# Patient Record
Sex: FEMALE | URBAN - METROPOLITAN AREA
[De-identification: names, ages, dates, MRNs, and addresses within clinical notes are randomized per-mention and may not be internally consistent; named-entity substitution may affect disease eponyms.]

---

## 2017-09-18 ENCOUNTER — THERAPY VISIT (OUTPATIENT)
Dept: PHYSICAL THERAPY | Facility: CLINIC | Age: 57
End: 2017-09-18
Payer: OTHER MISCELLANEOUS

## 2017-09-18 DIAGNOSIS — M25.512 ACUTE PAIN OF LEFT SHOULDER: Primary | ICD-10-CM

## 2017-09-18 PROCEDURE — 97010 HOT OR COLD PACKS THERAPY: CPT | Mod: GP | Performed by: PHYSICAL THERAPIST

## 2017-09-18 PROCEDURE — 97110 THERAPEUTIC EXERCISES: CPT | Mod: GP | Performed by: PHYSICAL THERAPIST

## 2017-09-18 PROCEDURE — 97161 PT EVAL LOW COMPLEX 20 MIN: CPT | Mod: GP | Performed by: PHYSICAL THERAPIST

## 2017-09-18 NOTE — PROGRESS NOTES
Subjective:    Patient is a 57 year old female presenting with rehab left ankle/foot hpi.                                      Pertinent medical history includes:  Sleep disorder/apnea and overweight.    Other surgeries include:  Orthopedic surgery.  Current medications:  Anti-inflammatory and other.  Current occupation is .    Primary job tasks include:  Lifting, driving and repetitive tasks.                                Objective:    System    Physical Exam    General     ROS    Assessment/Plan:

## 2017-09-18 NOTE — LETTER
[facility]  [address] [address] [place] [id number]  [phone]    2017    Re: Irqa Huynh   :   1960  MRN:  [id number]   REFERRING PHYSICIAN:   Favian Mast    [facility]  Date of Initial Evaluation:  17  Visits:  Rxs Used: 1  Reason for Referral:  Acute pain of left shoulder    EVALUATION SUMMARY  [facility] Initial Evaluation  Subjective:  Patient is a 57 year old female presenting with rehab left shoulder hpi. The history is provided by the patient. No  was used.   Iqra Huynh is a 57 year old female with a left shoulder condition.  Condition occurred with:  Repetition/overuse.  Condition occurred: at work.  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 Iqra is right arm dominant.  Currently not driving since last Friday..    Patient reports pain:  Lateral and in the joint.    Pain is described as aching and is intermittent Pain Scale: 1-3/10 when painful.  Associated symptoms:  Loss of motion/stiffness. 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  Symptoms are exacerbated by using arm overhead, using arm behind back and lying on extremity and relieved by rest, ice and NSAID's.  Since onset symptoms are gradually improving.  Special testing: no imaging.  Previous treatment includes physical therapy and surgery (Right ACL and TKA (7 surgeries)).  There was moderate improvement following previous treatment.  General health as reported by patient is good.  Patient is currently not working due to present treatment problem.   Pertinent medical history includes:  Sleep disorder/apnea and overweight.    Other surgeries include:  Orthopedic surgery.  Current medications:  Anti-inflammatory and other.  Current occupation  is .    Primary job tasks include:  Lifting, driving and repetitive tasks.    Barriers include:  None as reported by the patient.    Red flags:  None as reported by the patient.        Objective:  Standing Alignment:    Cervical/Thoracic:  Forward head  Shoulder/UE:  Rounded shoulders, protracted scapula R and protracted scapula L  Gait:    Gait Type:  Normal   Assistive Devices:  None  Non-Weight Bearing:  normal     Flexibility/Screens:   Positive screens:  Shoulder  Upper Extremity:    Decreased left upper extremity flexibility at:  Pectoralis Major and Pectoralis Minor  Decreased right upper extremity flexibility present at:  Pectoralis Major and Pectoralis Minor  Spine:  Decreased left spine flexibility:  Upper Trap  Decreased right spine flexibility:  Upper Trap       Shoulder Evaluation:  ROM:  AROM:    Flexion:  Left:  160+    Right:  170  Abduction:  Left: 155++   Right:  180  Flexion/External Rotation:  Left:  T2    Right:  T2  Extension/Internal Rotation:  Left:  T7    Right:  T7      Re: Iqra Huynh   :   1960  PROM:    Flexion:  Left:  160+ end range    Right: WNL    Abduction:  Left:  160+ end range pain    Right:  WNL  Internal Rotation:  Left:  35+    Right:  40  Strength:  normal (Left shoulder strong with mild pain on resisted ABD at 90 degrees)  Stability Testing:  normal  Special Tests:    Left shoulder positive for the following special tests:  Impingement  Left shoulder negative for the following special tests:  Labral; Bursal; Rotator cuff tear and Acromioclavicular  Palpation:    Left shoulder tenderness present at:  Infraspinatus and Teres Minor  Left shoulder tenderness not present at: Supraspinatus; Subscapularis; Levator; Rhomboids; Upper Trap or Bicipital Groove  Mobility Tests:    Glenohumeral posterior left:  Hypomobile      Assessment/Plan:    Patient is a 57 year old female with left side shoulder complaints.    Patient has the following significant findings  with corresponding treatment plan.        Diagnosis 1:  Shoulder Impingement      Pain -  hot/cold therapy, US, manual therapy, self management, education and home program  Decreased ROM/flexibility - manual therapy, therapeutic exercise and home program  Decreased strength - therapeutic exercise, therapeutic activities and home program  Inflammation - cold therapy, US and self management/home program  Impaired muscle performance - neuro re-education and home program  Decreased function - therapeutic activities and home program  Impaired posture - neuro re-education and home program    Therapy Evaluation Codes:   1) History comprised of:   Personal factors that impact the plan of care:      None.    Comorbidity factors that impact the plan of care are:      Numbness/tingling, Overweight, Pain at night/rest and Sleep disorder/apnea.     Medications impacting care: Anti-inflammatory.  2) Examination of Body Systems comprised of:   Body structures and functions that impact the plan of care:      Shoulder.   Activity limitations that impact the plan of care are:      Lifting, Laying down and Reaching.  3) Clinical presentation characteristics are:   Stable/Uncomplicated.  4) Decision-Making    Low complexity using standardized patient assessment instrument and/or measureable assessment of functional outcome.  Cumulative Therapy Evaluation is: Low complexity.    Previous and current functional limitations:  (See Goal Flow Sheet for this information)    Short term and Long term goals: (See Goal Flow Sheet for this information)     Communication ability:  Patient appears to be able to clearly communicate and understand verbal and written communication and follow directions correctly.  Treatment Explanation - The following has been discussed with the patient:   RX ordered/plan of care  Anticipated outcomes  Possible risks and side effects  This patient would benefit from PT intervention to resume normal activities.   Rehab  potential is good.            Re: Iqra Huynh   :   1960    Frequency:  2 X week, once daily  Duration:  for 3 weeks  Discharge Plan:  Achieve all LTG.  Independent in home treatment program.  Return to work with or without restrictions.  Return to previous functional level by discharge.  Reach maximal therapeutic benefit.          Thank you for your referral.    INQUIRIES  Therapist: DEBORAH Ibarra [address] [address]  Phone: [phone]  Fax: [phone]

## 2017-09-18 NOTE — PROGRESS NOTES
[facility] Initial Evaluation    Subjective:    Patient is a 57 year old female presenting with rehab left shoulder hpi. The history is provided by the patient. No  was used.   Iqra Huynh is a 57 year old female with a left shoulder condition.  Condition occurred with:  Repetition/overuse.  Condition occurred: at work.  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 Iqra is right arm dominant.  Currently not driving since last Friday..    Patient reports pain:  Lateral and in the joint.    Pain is described as aching and is intermittent Pain Scale: 1-3/10 when painful.  Associated symptoms:  Loss of motion/stiffness. 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  Symptoms are exacerbated by using arm overhead, using arm behind back and lying on extremity and relieved by rest, ice and NSAID's.  Since onset symptoms are gradually improving.  Special testing: no imaging.  Previous treatment includes physical therapy and surgery (Right ACL and TKA (7 surgeries)).  There was moderate improvement following previous treatment.  General health as reported by patient is good.            Patient is currently not working due to present treatment problem.      Barriers include:  None as reported by the patient.    Red flags:  None as reported by the patient.                        Objective:    Standing Alignment:    Cervical/Thoracic:  Forward head  Shoulder/UE:  Rounded shoulders, protracted scapula R and protracted scapula L              Gait:    Gait Type:  Normal   Assistive Devices:  None    Non-Weight Bearing:  normal             Flexibility/Screens:   Positive screens:  Shoulder  Upper Extremity:    Decreased left upper extremity flexibility at:  Pectoralis Major and Pectoralis Minor    Decreased  right upper extremity flexibility present at:  Pectoralis Major and Pectoralis Minor    Spine:  Decreased left spine flexibility:  Upper Trap    Decreased right spine flexibility:  Upper Trap                       Shoulder Evaluation:  ROM:  AROM:    Flexion:  Left:  160+    Right:  170    Abduction:  Left: 155++   Right:  180                Flexion/External Rotation:  Left:  T2    Right:  T2  Extension/Internal Rotation:  Left:  T7    Right:  T7    PROM:    Flexion:  Left:  160+ end range    Right: WNL      Abduction:  Left:  160+ end range pain    Right:  WNL    Internal Rotation:  Left:  35+    Right:  40                      Strength:  normal (Left shoulder strong with mild pain on resisted ABD at 90 degrees)                      Stability Testing:  normal      Special Tests:    Left shoulder positive for the following special tests:  Impingement  Left shoulder negative for the following special tests:  Labral; Bursal; Rotator cuff tear and Acromioclavicular    Palpation:    Left shoulder tenderness present at:  Infraspinatus and Teres Minor  Left shoulder tenderness not present at: Supraspinatus; Subscapularis; Levator; Rhomboids; Upper Trap or Bicipital Groove    Mobility Tests:      Glenohumeral posterior left:  Hypomobile                                               General     ROS    Assessment/Plan:      Patient is a 57 year old female with left side shoulder complaints.    Patient has the following significant findings with corresponding treatment plan.                  Diagnosis 1:  Shoulder Impingement      Pain -  hot/cold therapy, US, manual therapy, self management, education and home program  Decreased ROM/flexibility - manual therapy, therapeutic exercise and home program  Decreased strength - therapeutic exercise, therapeutic activities and home program  Inflammation - cold therapy, US and self management/home program  Impaired muscle performance - neuro re-education and home program  Decreased  function - therapeutic activities and home program  Impaired posture - neuro re-education and home program    Therapy Evaluation Codes:   1) History comprised of:   Personal factors that impact the plan of care:      None.    Comorbidity factors that impact the plan of care are:      Numbness/tingling, Overweight, Pain at night/rest and Sleep disorder/apnea.     Medications impacting care: Anti-inflammatory.  2) Examination of Body Systems comprised of:   Body structures and functions that impact the plan of care:      Shoulder.   Activity limitations that impact the plan of care are:      Lifting, Laying down and Reaching.  3) Clinical presentation characteristics are:   Stable/Uncomplicated.  4) Decision-Making    Low complexity using standardized patient assessment instrument and/or measureable assessment of functional outcome.  Cumulative Therapy Evaluation is: Low complexity.    Previous and current functional limitations:  (See Goal Flow Sheet for this information)    Short term and Long term goals: (See Goal Flow Sheet for this information)     Communication ability:  Patient appears to be able to clearly communicate and understand verbal and written communication and follow directions correctly.  Treatment Explanation - The following has been discussed with the patient:   RX ordered/plan of care  Anticipated outcomes  Possible risks and side effects  This patient would benefit from PT intervention to resume normal activities.   Rehab potential is good.    Frequency:  2 X week, once daily  Duration:  for 3 weeks  Discharge Plan:  Achieve all LTG.  Independent in home treatment program.  Return to work with or without restrictions.  Return to previous functional level by discharge.  Reach maximal therapeutic benefit.    Please refer to the daily flowsheet for treatment today, total treatment time and time spent performing 1:1 timed codes.

## 2017-09-21 ENCOUNTER — THERAPY VISIT (OUTPATIENT)
Dept: PHYSICAL THERAPY | Facility: CLINIC | Age: 57
End: 2017-09-21
Payer: OTHER MISCELLANEOUS

## 2017-09-21 DIAGNOSIS — M25.512 ACUTE PAIN OF LEFT SHOULDER: ICD-10-CM

## 2017-09-21 PROCEDURE — 97110 THERAPEUTIC EXERCISES: CPT | Mod: GP | Performed by: PHYSICAL THERAPIST

## 2017-09-21 PROCEDURE — 97112 NEUROMUSCULAR REEDUCATION: CPT | Mod: GP | Performed by: PHYSICAL THERAPIST

## 2017-09-21 PROCEDURE — 97010 HOT OR COLD PACKS THERAPY: CPT | Mod: GP | Performed by: PHYSICAL THERAPIST

## 2017-09-25 ENCOUNTER — THERAPY VISIT (OUTPATIENT)
Dept: PHYSICAL THERAPY | Facility: CLINIC | Age: 57
End: 2017-09-25
Payer: OTHER MISCELLANEOUS

## 2017-09-25 DIAGNOSIS — M25.512 ACUTE PAIN OF LEFT SHOULDER: ICD-10-CM

## 2017-09-25 PROCEDURE — 97035 APP MDLTY 1+ULTRASOUND EA 15: CPT | Mod: GP | Performed by: PHYSICAL THERAPIST

## 2017-09-25 PROCEDURE — 97010 HOT OR COLD PACKS THERAPY: CPT | Mod: GP | Performed by: PHYSICAL THERAPIST

## 2017-09-25 PROCEDURE — 97140 MANUAL THERAPY 1/> REGIONS: CPT | Mod: GP | Performed by: PHYSICAL THERAPIST

## 2017-09-25 PROCEDURE — 97110 THERAPEUTIC EXERCISES: CPT | Mod: GP | Performed by: PHYSICAL THERAPIST

## 2017-09-27 ENCOUNTER — THERAPY VISIT (OUTPATIENT)
Dept: PHYSICAL THERAPY | Facility: CLINIC | Age: 57
End: 2017-09-27
Payer: OTHER MISCELLANEOUS

## 2017-09-27 DIAGNOSIS — M25.512 ACUTE PAIN OF LEFT SHOULDER: ICD-10-CM

## 2017-09-27 PROCEDURE — 97035 APP MDLTY 1+ULTRASOUND EA 15: CPT | Mod: GP | Performed by: PHYSICAL THERAPIST

## 2017-09-27 PROCEDURE — 97110 THERAPEUTIC EXERCISES: CPT | Mod: GP | Performed by: PHYSICAL THERAPIST

## 2017-09-27 PROCEDURE — 97140 MANUAL THERAPY 1/> REGIONS: CPT | Mod: GP | Performed by: PHYSICAL THERAPIST

## 2017-09-27 NOTE — PROGRESS NOTES
Subjective:    HPI                    Objective:    System    Physical Exam    General     ROS    Assessment/Plan:      PROGRESS  REPORT    Progress reporting period is from 9-18-17 to 9-27-17.       SUBJECTIVE  Subjective changes noted by patient:  Iqra reports interm vs constant pain anterior left shoulder. Aggravated if sleeps in wrong position. Mild pain with reaching overhead =2-3/10.     Current pain level is: 1/10 (2-3/10 overhead).     Previous pain level was: 6/10.   Changes in function:  Yes (See Goal flowsheet attached for changes in current functional level)  Adverse reaction to treatment or activity: None    OBJECTIVE  Changes noted in objective findings: Active Left shoulder FLexion = 170 (+10 degrees) with end range pain = 1/10. Strong but pain reported with resisted left shoulder ER, Flex and Abd @ 90 degrees. Suspect anterior shoulder impingement with biceps tendonitis (subscap?). Iqra making progress in ROM, strength and decreased pain.      ASSESSMENT/PLAN  Updated problem list and treatment plan: Diagnosis 1:  Left Ant. Shoulder Impingement  Pain -  hot/cold therapy, US, manual therapy, self management, education and home program  Decreased ROM/flexibility - manual therapy, therapeutic exercise and home program  Decreased strength - therapeutic exercise, therapeutic activities and home program  Inflammation - cold therapy, US and self management/home program  Impaired muscle performance - neuro re-education and home program  Decreased function - therapeutic activities and home program  Impaired posture - neuro re-education and home program  STG/LTGs have been met or progress has been made towards goals:  Yes (See Goal flow sheet completed today.)  Assessment of Progress: The patient's condition is improving.  Patient is meeting short term goals and is progressing towards long term goals.  Self Management Plans:  Patient has been instructed in a home treatment program.  Patient  has been  instructed in self management of symptoms.  I have re-evaluated this patient and find that the nature, scope, duration and intensity of the therapy is appropriate for the medical condition of the patient.  Iqra continues to require the following intervention to meet STG and LTG's:  PT    Recommendations:  This patient would benefit from continued therapy.     Frequency:  2 X week, once daily  Duration:  for 2 weeks          Please refer to the daily flowsheet for treatment today, total treatment time and time spent performing 1:1 timed codes.

## 2017-10-02 ENCOUNTER — THERAPY VISIT (OUTPATIENT)
Dept: PHYSICAL THERAPY | Facility: CLINIC | Age: 57
End: 2017-10-02
Payer: OTHER MISCELLANEOUS

## 2017-10-02 DIAGNOSIS — M25.512 ACUTE PAIN OF LEFT SHOULDER: ICD-10-CM

## 2017-10-02 PROCEDURE — 97140 MANUAL THERAPY 1/> REGIONS: CPT | Mod: GP | Performed by: PHYSICAL THERAPIST

## 2017-10-02 PROCEDURE — 97035 APP MDLTY 1+ULTRASOUND EA 15: CPT | Mod: GP | Performed by: PHYSICAL THERAPIST

## 2017-10-02 PROCEDURE — 97110 THERAPEUTIC EXERCISES: CPT | Mod: GP | Performed by: PHYSICAL THERAPIST

## 2017-10-05 ENCOUNTER — THERAPY VISIT (OUTPATIENT)
Dept: PHYSICAL THERAPY | Facility: CLINIC | Age: 57
End: 2017-10-05
Payer: OTHER MISCELLANEOUS

## 2017-10-05 DIAGNOSIS — M25.512 ACUTE PAIN OF LEFT SHOULDER: ICD-10-CM

## 2017-10-05 PROCEDURE — 97110 THERAPEUTIC EXERCISES: CPT | Mod: GP | Performed by: PHYSICAL THERAPIST

## 2017-10-05 PROCEDURE — 97035 APP MDLTY 1+ULTRASOUND EA 15: CPT | Mod: GP | Performed by: PHYSICAL THERAPIST

## 2017-10-05 PROCEDURE — 97014 ELECTRIC STIMULATION THERAPY: CPT | Mod: GP | Performed by: PHYSICAL THERAPIST

## 2017-10-05 PROCEDURE — 97140 MANUAL THERAPY 1/> REGIONS: CPT | Mod: GP | Performed by: PHYSICAL THERAPIST

## 2017-10-11 ENCOUNTER — THERAPY VISIT (OUTPATIENT)
Dept: PHYSICAL THERAPY | Facility: CLINIC | Age: 57
End: 2017-10-11
Payer: OTHER MISCELLANEOUS

## 2017-10-11 DIAGNOSIS — M25.512 ACUTE PAIN OF LEFT SHOULDER: ICD-10-CM

## 2017-10-11 PROCEDURE — 97035 APP MDLTY 1+ULTRASOUND EA 15: CPT | Mod: GP | Performed by: PHYSICAL THERAPIST

## 2017-10-11 PROCEDURE — 97110 THERAPEUTIC EXERCISES: CPT | Mod: GP | Performed by: PHYSICAL THERAPIST

## 2017-10-11 PROCEDURE — 97140 MANUAL THERAPY 1/> REGIONS: CPT | Mod: GP | Performed by: PHYSICAL THERAPIST

## 2017-10-11 NOTE — PROGRESS NOTES
Subjective:    HPI                    Objective:    System    Physical Exam    General     ROS    Assessment/Plan:      PROGRESS  REPORT    Progress reporting period is from 9-18-17 to 10-11-17.       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 She reports intermittent left shoulder pain. Can become irritated reaching overhead, out to side and reaching back. Unable to sleep on left shoulder yet. HEP going well with experience of fatigue or stretch. Feels the exercises are beneficial.     Current pain level is: 0-2/10.     Previous pain level was: 6/10.   Changes in function:  Yes (See Goal flowsheet attached for changes in current functional level)  Adverse reaction to treatment or activity: None    OBJECTIVE  Changes noted in objective findings:   Strong left shoulder with MMT with mild pain left UT muscle (substitution pattern). AROM left shoulder Flex = 170 with pain = 2/10 end range. Denies TTP left biceps tendon but today UT and Levator muscle TTP. 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      ASSESSMENT/PLAN  Updated problem list and treatment plan: Diagnosis 1:  Left Ant Shoulder Impinement  Pain -  hot/cold therapy, US, manual therapy, self management, education and home program  Decreased ROM/flexibility - manual therapy, therapeutic exercise, therapeutic activity and home program  Decreased strength - therapeutic exercise, therapeutic activities and home program  Inflammation - cold therapy, US and self management/home program  Impaired muscle performance - neuro re-education and home program  Decreased function - therapeutic activities and home program  STG/LTGs have been met or progress has been made towards goals:  Yes (See Goal flow sheet completed today.)  Assessment of Progress: The patient's condition is  improving.  Patient is meeting short term goals and is progressing towards long term goals.  Self Management Plans:  Patient is independent in a home treatment program.  Patient is independent in self management of symptoms.  I have re-evaluated this patient and find that the nature, scope, duration and intensity of the therapy is appropriate for the medical condition of the patient.  Iqra continues to require the following intervention to meet STG and LTG's:  PT    Recommendations:  This patient would benefit from continued therapy.     Frequency:  2 X week, once daily  Duration:  for 1-2 weeks          Please refer to the daily flowsheet for treatment today, total treatment time and time spent performing 1:1 timed codes.

## 2017-10-11 NOTE — LETTER
[facility]  [address] [address] [place] [id number]  [phone]    2017    Re: Iqra Huynh   :   1960  MRN:  [id number]   REFERRING PHYSICIAN:   Favian Mast    [facility]    Date of Initial Evaluation:  17  Visits:  Rxs Used: 7  Reason for Referral:  Acute pain of left shoulder    PROGRESS  REPORT    Progress reporting period is from 17 to 10-11-17.       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 She reports intermittent left shoulder pain. Can become irritated reaching overhead, out to side and reaching back. Unable to sleep on left shoulder yet. HEP going well with experience of fatigue or stretch. Feels the exercises are beneficial.     Current pain level is: 0-2/10.     Previous pain level was: 6/10.   Changes in function:  Yes (See Goal flowsheet attached for changes in current functional level)  Adverse reaction to treatment or activity: None    OBJECTIVE  Changes noted in objective findings:   Strong left shoulder with MMT with mild pain left UT muscle (substitution pattern). AROM left shoulder Flex = 170 with pain = 2/10 end range. Denies TTP left biceps tendon but today UT and Levator muscle TTP. 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      ASSESSMENT/PLAN  Updated problem list and treatment plan: Diagnosis 1:  Left Ant Shoulder Impinement  Pain -  hot/cold therapy, US, manual therapy, self management, education and home program  Decreased ROM/flexibility - manual therapy, therapeutic exercise, therapeutic activity and home program  Decreased strength - therapeutic exercise, therapeutic activities and home program  Inflammation - cold therapy, US and self management/home program  Impaired muscle performance - neuro re-education and home  program  Decreased function - therapeutic activities and home program  STG/LTGs have been met or progress has been made towards goals:  Yes (See Goal flow sheet completed today.)  Assessment of Progress: The patient's condition is improving.  Patient is meeting short term goals and is progressing towards long term goals.  Self Management Plans:  Patient is independent in a home treatment program.  Patient is independent in self management of symptoms.  I have re-evaluated this patient and find that the nature, scope, duration and intensity of the therapy is appropriate for the medical condition of the patient.  Iqra continues to require the following intervention to meet STG and LTG's:  PT    Recommendations:  This patient would benefit from continued therapy.     Frequency:  2 X week, once daily  Duration:  for 1-2 weeks    Thank you for your referral.    INQUIRIES  Therapist: Adriana Hartman, PT   [address] [address]  Phone: [phone]  Fax: [phone]

## 2017-10-13 ENCOUNTER — THERAPY VISIT (OUTPATIENT)
Dept: PHYSICAL THERAPY | Facility: CLINIC | Age: 57
End: 2017-10-13
Payer: OTHER MISCELLANEOUS

## 2017-10-13 ENCOUNTER — TELEPHONE (OUTPATIENT)
Dept: PHYSICAL THERAPY | Facility: CLINIC | Age: 57
End: 2017-10-13

## 2017-10-13 DIAGNOSIS — M25.512 ACUTE PAIN OF LEFT SHOULDER: ICD-10-CM

## 2017-10-13 PROCEDURE — 97035 APP MDLTY 1+ULTRASOUND EA 15: CPT | Mod: GP | Performed by: PHYSICAL THERAPIST

## 2017-10-13 PROCEDURE — 97110 THERAPEUTIC EXERCISES: CPT | Mod: GP | Performed by: PHYSICAL THERAPIST

## 2017-10-13 PROCEDURE — 97140 MANUAL THERAPY 1/> REGIONS: CPT | Mod: GP | Performed by: PHYSICAL THERAPIST

## 2017-10-16 ENCOUNTER — THERAPY VISIT (OUTPATIENT)
Dept: PHYSICAL THERAPY | Facility: CLINIC | Age: 57
End: 2017-10-16
Payer: OTHER MISCELLANEOUS

## 2017-10-16 DIAGNOSIS — M25.512 ACUTE PAIN OF LEFT SHOULDER: ICD-10-CM

## 2017-10-16 PROCEDURE — 97035 APP MDLTY 1+ULTRASOUND EA 15: CPT | Mod: GP | Performed by: PHYSICAL THERAPIST

## 2017-10-16 PROCEDURE — 97140 MANUAL THERAPY 1/> REGIONS: CPT | Mod: GP | Performed by: PHYSICAL THERAPIST

## 2017-10-18 ENCOUNTER — TELEPHONE (OUTPATIENT)
Dept: PHYSICAL THERAPY | Facility: CLINIC | Age: 57
End: 2017-10-18

## 2017-10-18 ENCOUNTER — THERAPY VISIT (OUTPATIENT)
Dept: PHYSICAL THERAPY | Facility: CLINIC | Age: 57
End: 2017-10-18
Payer: OTHER MISCELLANEOUS

## 2017-10-18 DIAGNOSIS — M25.512 ACUTE PAIN OF LEFT SHOULDER: ICD-10-CM

## 2017-10-18 PROCEDURE — 97140 MANUAL THERAPY 1/> REGIONS: CPT | Mod: GP | Performed by: PHYSICAL THERAPIST

## 2017-10-18 PROCEDURE — 97035 APP MDLTY 1+ULTRASOUND EA 15: CPT | Mod: GP | Performed by: PHYSICAL THERAPIST

## 2017-10-18 PROCEDURE — 97014 ELECTRIC STIMULATION THERAPY: CPT | Mod: GP | Performed by: PHYSICAL THERAPIST

## 2017-10-23 ENCOUNTER — THERAPY VISIT (OUTPATIENT)
Dept: PHYSICAL THERAPY | Facility: CLINIC | Age: 57
End: 2017-10-23
Payer: OTHER MISCELLANEOUS

## 2017-10-23 DIAGNOSIS — M25.512 ACUTE PAIN OF LEFT SHOULDER: ICD-10-CM

## 2017-10-23 PROCEDURE — 97014 ELECTRIC STIMULATION THERAPY: CPT | Mod: GP | Performed by: PHYSICAL THERAPIST

## 2017-10-23 PROCEDURE — 97035 APP MDLTY 1+ULTRASOUND EA 15: CPT | Mod: GP | Performed by: PHYSICAL THERAPIST

## 2017-10-23 PROCEDURE — 97140 MANUAL THERAPY 1/> REGIONS: CPT | Mod: GP | Performed by: PHYSICAL THERAPIST

## 2017-10-26 ENCOUNTER — THERAPY VISIT (OUTPATIENT)
Dept: PHYSICAL THERAPY | Facility: CLINIC | Age: 57
End: 2017-10-26
Payer: OTHER MISCELLANEOUS

## 2017-10-26 DIAGNOSIS — M25.512 ACUTE PAIN OF LEFT SHOULDER: ICD-10-CM

## 2017-10-26 PROCEDURE — 97035 APP MDLTY 1+ULTRASOUND EA 15: CPT | Mod: GP | Performed by: PHYSICAL THERAPIST

## 2017-10-26 PROCEDURE — 97014 ELECTRIC STIMULATION THERAPY: CPT | Mod: GP | Performed by: PHYSICAL THERAPIST

## 2017-10-26 PROCEDURE — 97140 MANUAL THERAPY 1/> REGIONS: CPT | Mod: GP | Performed by: PHYSICAL THERAPIST

## 2017-10-26 NOTE — PROGRESS NOTES
Subjective:    HPI                    Objective:    System    Physical Exam    General     ROS    Assessment/Plan:      PROGRESS  REPORT    Progress reporting period is from 10-11-17 to 10-26-17.       SUBJECTIVE  Subjective changes noted by patient:  Iqra experienced migraine this morning. She woke with increased pain in Left neck/shoulder. Yesterday experienced no pain at all.      Current pain level is: 2/10.     Previous pain level was: 6/10.   Changes in function:  Yes (See Goal flowsheet attached for changes in current functional level)  Adverse reaction to treatment or activity: None    OBJECTIVE  Changes noted in objective findings:  Full left shoulder AROM. Strong on MMT with mild pain reported with resisted left shoulder Flex and Abd @ 90 degrees. Left Upper trap muscle tightness. Suspect UT substituting when left arm elevated.      ASSESSMENT/PLAN  Updated problem list and treatment plan: Diagnosis 1:  Left Shoulder Impingement  Pain -  hot/cold therapy, US, electric stimulation, manual therapy, self management, education and home program  Decreased ROM/flexibility - manual therapy, therapeutic exercise and home program  Decreased strength - therapeutic exercise and therapeutic activities  Inflammation - cold therapy, US, electric stimulation and self management/home program  Impaired muscle performance - neuro re-education and home program  Decreased function - therapeutic activities and home program  STG/LTGs have been met or progress has been made towards goals:  Yes (See Goal flow sheet completed today.)  Assessment of Progress: The patient's condition is improving.  Patient is meeting short term goals and is progressing towards long term goals.  Self Management Plans:  Patient is independent in a home treatment program.  Patient is independent in self management of symptoms.  I have re-evaluated this patient and find that the nature, scope, duration and intensity of the therapy is appropriate for  the medical condition of the patientRaz Escalona continues to require the following intervention to meet STG and LTG's:  PT    Recommendations:  This patient would benefit from continued therapy.     Frequency:  1 X week, once daily  Duration:  for 2 weeks          Please refer to the daily flowsheet for treatment today, total treatment time and time spent performing 1:1 timed codes.

## 2017-10-26 NOTE — LETTER
[facility]  [address] [address] [place] [address]  [phone]    2017    Re: Iqra Huynh   :   1960  MRN:  [id number]   REFERRING PHYSICIAN:   Favian Mast    [facility]    Date of Initial Evaluation:  2017  Visits:  Rxs Used: 12  Reason for Referral:  Acute pain of left shoulder    PROGRESS  REPORT  Progress reporting period is from 10-11-17 to 10-26-17.       SUBJECTIVE  Subjective changes noted by patient:  Iqra experienced migraine this morning. She woke with increased pain in Left neck/shoulder. Yesterday experienced no pain at all.      Current pain level is: 2/10.   Previous pain level was: 6/10. Changes in function:  Yes (See Goal flowsheet attached for changes in current functional level)  Adverse reaction to treatment or activity: None    OBJECTIVE  Changes noted in objective findings:  Full left shoulder AROM. Strong on MMT with mild pain reported with resisted left shoulder Flex and Abd @ 90 degrees. Left Upper trap muscle tightness. Suspect UT substituting when left arm elevated.      ASSESSMENT/PLAN  Updated problem list and treatment plan: Diagnosis 1:  Left Shoulder Impingement  Pain -  hot/cold therapy, US, electric stimulation, manual therapy, self management, education and home program  Decreased ROM/flexibility - manual therapy, therapeutic exercise and home program  Decreased strength - therapeutic exercise and therapeutic activities  Inflammation - cold therapy, US, electric stimulation and self management/home program  Impaired muscle performance - neuro re-education and home program  Decreased function - therapeutic activities and home program  STG/LTGs have been met or progress has been made towards goals:  Yes (See Goal flow sheet completed today.)  Assessment of Progress: The patient's condition is improving.  Patient is meeting short term goals and is progressing towards long term goals.  Self Management Plans:   Patient is independent in a home treatment program.  Patient is independent in self management of symptoms.  I have re-evaluated this patient and find that the nature, scope, duration and intensity of the therapy is appropriate for the medical condition of the patient.  Iqra continues to require the following intervention to meet STG and LTG's:  PT      Recommendations:  This patient would benefit from continued therapy.     Frequency:  1 X week, once daily  Duration:  for 2 weeks    Thank you for your referral.    INQUIRIES  Therapist: Adriana Hartman PT   [address] [address]  Phone: [phone]  Fax: [phone]

## 2017-11-09 ENCOUNTER — THERAPY VISIT (OUTPATIENT)
Dept: PHYSICAL THERAPY | Facility: CLINIC | Age: 57
End: 2017-11-09
Payer: OTHER MISCELLANEOUS

## 2017-11-09 DIAGNOSIS — M25.512 ACUTE PAIN OF LEFT SHOULDER: ICD-10-CM

## 2017-11-09 PROCEDURE — 97110 THERAPEUTIC EXERCISES: CPT | Mod: GP | Performed by: PHYSICAL THERAPIST

## 2017-11-09 PROCEDURE — 97140 MANUAL THERAPY 1/> REGIONS: CPT | Mod: GP | Performed by: PHYSICAL THERAPIST

## 2017-11-09 PROCEDURE — 97014 ELECTRIC STIMULATION THERAPY: CPT | Mod: GP | Performed by: PHYSICAL THERAPIST

## 2017-11-16 ENCOUNTER — THERAPY VISIT (OUTPATIENT)
Dept: PHYSICAL THERAPY | Facility: CLINIC | Age: 57
End: 2017-11-16
Payer: OTHER MISCELLANEOUS

## 2017-11-16 DIAGNOSIS — M25.512 ACUTE PAIN OF LEFT SHOULDER: ICD-10-CM

## 2017-11-16 PROCEDURE — 97110 THERAPEUTIC EXERCISES: CPT | Mod: GP | Performed by: PHYSICAL THERAPIST

## 2017-11-16 PROCEDURE — 97140 MANUAL THERAPY 1/> REGIONS: CPT | Mod: GP | Performed by: PHYSICAL THERAPIST

## 2017-11-16 PROCEDURE — 97014 ELECTRIC STIMULATION THERAPY: CPT | Mod: GP | Performed by: PHYSICAL THERAPIST

## 2017-11-30 ENCOUNTER — THERAPY VISIT (OUTPATIENT)
Dept: PHYSICAL THERAPY | Facility: CLINIC | Age: 57
End: 2017-11-30
Payer: OTHER MISCELLANEOUS

## 2017-11-30 DIAGNOSIS — M25.512 ACUTE PAIN OF LEFT SHOULDER: ICD-10-CM

## 2017-11-30 PROCEDURE — 97112 NEUROMUSCULAR REEDUCATION: CPT | Mod: GP | Performed by: PHYSICAL THERAPIST

## 2017-11-30 PROCEDURE — 97140 MANUAL THERAPY 1/> REGIONS: CPT | Mod: GP | Performed by: PHYSICAL THERAPIST

## 2017-11-30 PROCEDURE — 97110 THERAPEUTIC EXERCISES: CPT | Mod: GP | Performed by: PHYSICAL THERAPIST

## 2017-12-07 ENCOUNTER — THERAPY VISIT (OUTPATIENT)
Dept: PHYSICAL THERAPY | Facility: CLINIC | Age: 57
End: 2017-12-07
Payer: OTHER MISCELLANEOUS

## 2017-12-07 DIAGNOSIS — M25.512 ACUTE PAIN OF LEFT SHOULDER: ICD-10-CM

## 2017-12-07 PROCEDURE — 97140 MANUAL THERAPY 1/> REGIONS: CPT | Mod: GP | Performed by: PHYSICAL THERAPIST

## 2017-12-07 PROCEDURE — 97110 THERAPEUTIC EXERCISES: CPT | Mod: GP | Performed by: PHYSICAL THERAPIST

## 2017-12-07 PROCEDURE — 97112 NEUROMUSCULAR REEDUCATION: CPT | Mod: GP | Performed by: PHYSICAL THERAPIST

## 2017-12-14 ENCOUNTER — THERAPY VISIT (OUTPATIENT)
Dept: PHYSICAL THERAPY | Facility: CLINIC | Age: 57
End: 2017-12-14
Payer: OTHER MISCELLANEOUS

## 2017-12-14 DIAGNOSIS — M25.512 ACUTE PAIN OF LEFT SHOULDER: ICD-10-CM

## 2017-12-14 PROCEDURE — 97110 THERAPEUTIC EXERCISES: CPT | Mod: GP | Performed by: PHYSICAL THERAPIST

## 2017-12-14 PROCEDURE — 97140 MANUAL THERAPY 1/> REGIONS: CPT | Mod: GP | Performed by: PHYSICAL THERAPIST

## 2017-12-14 PROCEDURE — 97112 NEUROMUSCULAR REEDUCATION: CPT | Mod: GP | Performed by: PHYSICAL THERAPIST

## 2017-12-14 PROCEDURE — 97014 ELECTRIC STIMULATION THERAPY: CPT | Mod: GP | Performed by: PHYSICAL THERAPIST

## 2017-12-21 ENCOUNTER — THERAPY VISIT (OUTPATIENT)
Dept: PHYSICAL THERAPY | Facility: CLINIC | Age: 57
End: 2017-12-21
Payer: OTHER MISCELLANEOUS

## 2017-12-21 DIAGNOSIS — M25.512 ACUTE PAIN OF LEFT SHOULDER: ICD-10-CM

## 2017-12-21 PROCEDURE — 97140 MANUAL THERAPY 1/> REGIONS: CPT | Mod: GP | Performed by: PHYSICAL THERAPIST

## 2017-12-21 PROCEDURE — 97112 NEUROMUSCULAR REEDUCATION: CPT | Mod: GP | Performed by: PHYSICAL THERAPIST

## 2017-12-21 PROCEDURE — 97110 THERAPEUTIC EXERCISES: CPT | Mod: GP | Performed by: PHYSICAL THERAPIST

## 2017-12-26 ENCOUNTER — THERAPY VISIT (OUTPATIENT)
Dept: PHYSICAL THERAPY | Facility: CLINIC | Age: 57
End: 2017-12-26
Payer: OTHER MISCELLANEOUS

## 2017-12-26 DIAGNOSIS — M25.512 ACUTE PAIN OF LEFT SHOULDER: ICD-10-CM

## 2017-12-26 PROCEDURE — 97112 NEUROMUSCULAR REEDUCATION: CPT | Mod: GP | Performed by: PHYSICAL THERAPIST

## 2017-12-26 PROCEDURE — 97110 THERAPEUTIC EXERCISES: CPT | Mod: GP | Performed by: PHYSICAL THERAPIST

## 2017-12-26 PROCEDURE — 97140 MANUAL THERAPY 1/> REGIONS: CPT | Mod: GP | Performed by: PHYSICAL THERAPIST

## 2017-12-26 NOTE — PROGRESS NOTES
Subjective:  HPI                    Objective:  System    Physical Exam    General     ROS    Assessment/Plan:    PROGRESS  REPORT    Progress reporting period is from 10-26-17 to 12-26-17.       SUBJECTIVE  Subjective changes noted by patient: Iqra's left shoulder is doing well. Her cheif c/o reaching back and up (abduction ER end range). Able to sleep on left shoulder. 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     Current pain level is: 0/10.     Previous pain level was: 6/10.   Changes in function:  Yes (See Goal flowsheet attached for changes in current functional level)  Adverse reaction to treatment or activity: None    OBJECTIVE  Changes noted in objective findings:  Yes,  Active ROM left shoulder FLex = 175, Abd = 175 and IR/Ext = L1, all with end range pain = 0-1/10. Strong and pain free MMT.       ASSESSMENT/PLAN  Updated problem list and treatment plan: Diagnosis 1:  Left Shoulder Bursitis   Pain -  hot/cold therapy, electric stimulation, manual therapy, self management, education and home program  Decreased ROM/flexibility - manual therapy, therapeutic exercise and home program  Decreased strength - therapeutic exercise, therapeutic activities and home program  Inflammation - cold therapy, electric stimulation and self management/home program  Impaired muscle performance - neuro re-education and home program  Decreased function - therapeutic activities and home program  STG/LTGs have been met or progress has been made towards goals:  Yes (See Goal flow sheet completed today.)  Assessment of Progress: The patient's condition is improving.  The patient has met all of their long term goals.  Self Management Plans:  Patient is independent in a home treatment program.  Patient is independent in self management of symptoms.  I have re-evaluated this patient and find that the nature, scope, duration and intensity of the therapy is appropriate for the medical  condition of the patient.  Iqra continues to require the following intervention to meet STG and LTG's:  PT intervention is no longer required to meet STG/LTG.    Recommendations:  This patient would benefit from further evaluation.    Please refer to the daily flowsheet for treatment today, total treatment time and time spent performing 1:1 timed codes.

## 2017-12-26 NOTE — LETTER
[facility]  [address] [address] [place] [address]  [phone]    2017    Re: Iqra Huynh   :   1960  MRN:  [id number]   [facility]ING PHYSICIAN:   [facility]    Date of Initial Evaluation:  17  Visits:  Rxs Used: 19  Reason for Referral:  Acute pain of left shoulder  PROGRESS  REPORT    Progress reporting period is from 10-26-17 to 17.       SUBJECTIVE  Subjective changes noted by patient: Iqra's left shoulder is doing well. Her cheif c/o reaching back and up (abduction ER end range). Able to sleep on left shoulder. 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     Current pain level is: 0/10.     Previous pain level was: 6/10.   Changes in function:  Yes (See Goal flowsheet attached for changes in current functional level)  Adverse reaction to treatment or activity: None    OBJECTIVE  Changes noted in objective findings:  Yes,  Active ROM left shoulder FLex = 175, Abd = 175 and IR/Ext = L1, all with end range pain = 0-1/10. Strong and pain free MMT.       ASSESSMENT/PLAN  Updated problem list and treatment plan: Diagnosis 1:  Left Shoulder Bursitis   Pain -  hot/cold therapy, electric stimulation, manual therapy, self management, education and home program  Decreased ROM/flexibility - manual therapy, therapeutic exercise and home program  Decreased strength - therapeutic exercise, therapeutic activities and home program  Inflammation - cold therapy, electric stimulation and self management/home program  Impaired muscle performance - neuro re-education and home program  Decreased function - therapeutic activities and home program  STG/LTGs have been met or progress has been made towards goals:  Yes (See Goal flow sheet completed today.)  Assessment of Progress: The patient's condition is improving.  The patient has met all of their long term goals.  Self Management Plans:  Patient  is independent in a home treatment program.  Patient is independent in self management of symptoms.  I have re-evaluated this patient and find that the nature, scope, duration and intensity of the therapy is appropriate for the medical condition of the patient.  Iqra continues to require the following intervention to meet STG and LTG's:  PT intervention is no longer required to meet STG/LTG.    Recommendations:  This patient would benefit from further evaluation.    Thank you for your referral.    INQUIRIES  Therapist: Adriana Hartman PT  [address] [address]  Phone: [phone]  Fax: [phone]

## 2018-01-17 ENCOUNTER — THERAPY VISIT (OUTPATIENT)
Dept: PHYSICAL THERAPY | Facility: CLINIC | Age: 58
End: 2018-01-17
Payer: COMMERCIAL

## 2018-01-17 DIAGNOSIS — M25.571 PAIN IN JOINT INVOLVING ANKLE AND FOOT, RIGHT: Primary | ICD-10-CM

## 2018-01-17 DIAGNOSIS — M25.512 ACUTE PAIN OF LEFT SHOULDER: ICD-10-CM

## 2018-01-17 PROCEDURE — 97161 PT EVAL LOW COMPLEX 20 MIN: CPT | Mod: GP | Performed by: PHYSICAL THERAPIST

## 2018-01-17 PROCEDURE — 97140 MANUAL THERAPY 1/> REGIONS: CPT | Mod: GP | Performed by: PHYSICAL THERAPIST

## 2018-01-17 PROCEDURE — 97110 THERAPEUTIC EXERCISES: CPT | Mod: GP | Performed by: PHYSICAL THERAPIST

## 2018-01-17 PROCEDURE — 97035 APP MDLTY 1+ULTRASOUND EA 15: CPT | Mod: GP | Performed by: PHYSICAL THERAPIST

## 2018-01-17 NOTE — PROGRESS NOTES
[facility] Initial Evaluation    Subjective:  Patient is a 58 year old female presenting with rehab right ankle/foot hpi. The history is provided by the patient. No  was used.   Iqra Huynh is a 58 year old female with a right ankle and right foot condition.      This is a chronic condition  Iqra tore peroneal tendon right and repaired 6 years ago.   7 months ago started walking everyday for 1 hour. Developed pain medially,posterior, laterally and heel right foot and ankle. Was in boot cast Dec 2017 x  4-5 weeks and custom orthotics made which Iqra has been wearing since. Using boot cast PRN.  Has been exercising in pool for past 2 weeks.  Goal is to resume walking 45-60 mins (5 x week). .    Patient reports pain:  Anterior, posterior and sub calcaneus.    Quality: throbbing, tightness and stiffness. and is intermittent Pain Scale: ranges 3-4/10.  Associated symptoms:  Loss of motion/stiffness and edema. Pain is the same all the time.  Symptoms are exacerbated by weight bearing, standing, walking, ascending stairs, descending stairs and activity and relieved by rest and bracing/immobilizing.  Since onset symptoms are gradually improving.  Special tests:  MRI (Iqra does not have results of MRI).    There was significant improvement following previous treatment.  General health as reported by patient is good.                  Barriers include:  None as reported by patient.    Red flags:  None as reported by patient.                        Objective:  Standing Alignment:                Ankle/Foot:  Normal    Gait:    Gait Type:  Antalgic   Assistive Devices:  None  Deviations:  General Deviations:  Stance time decr    Flexibility/Screens:       Lower Extremity:      Decreased right lower extremity flexibility:  Hamstrings; Gastroc and Soleus          Ankle/Foot Evaluation  ROM:      PROM:    Dorsiflexion:  Left:    10     Right:   5   Plantarflexion:  Left:    50      "Right:  40  Inversion:  Left:      25     Right:   15  Eversion: Left:   25     Right:  25          Strength:    Dorsiflexion:  Right: /5 Strong/pain free  Pain:  Plantarflexion: Right: 3/5  Weak/painful  Pain:  Inversion:Right: 4-/5  Pain:  Eversion:Right: 4-/5  Weak/pain free  Pain:                  LIGAMENT TESTING: not assessed              SPECIAL TESTS: not assessed    PALPATION:     Right ankle tenderness present at:   achilles tendon  EDEMA:   Left ankle edema present at: 21\"  Right ankle edema present at:  medial; lateral; posterior and 21.5\"      Figure 8 left: 21\"Figure 8 right: 21.5\"  MOBILITY TESTING:         Subtalar Right: hypomobile      FUNCTIONAL TESTS: not assessed                                                              General     ROS    Assessment/Plan:    Patient is a 58 year old female with right side ankle complaints.    Patient has the following significant findings with corresponding treatment plan.                  Diagnosis 1:  Post Tib and achilles tendonitis      Pain -  hot/cold therapy, US, manual therapy, splint/taping/bracing/orthotics, self management, education and home program  Decreased ROM/flexibility - manual therapy, therapeutic exercise, therapeutic activity and home program  Decreased joint mobility - manual therapy, therapeutic exercise, therapeutic activity and home program  Decreased strength - therapeutic exercise, therapeutic activities and home program  Edema - cold therapy and self management/home program  Impaired gait - gait training and home program  Impaired muscle performance - neuro re-education and home program  Decreased function - therapeutic activities and home program    Therapy Evaluation Codes:   1) History comprised of:   Personal factors that impact the plan of care:      Time since onset of symptoms.    Comorbidity factors that impact the plan of care are:      Migraines/headaches, Osteoarthritis, Overweight and Sleep disorder/apnea.  "    Medications impacting care: Anti-inflammatory.  2) Examination of Body Systems comprised of:   Body structures and functions that impact the plan of care:      Ankle.   Activity limitations that impact the plan of care are:      Stairs, Standing and Walking.  3) Clinical presentation characteristics are:   Stable/Uncomplicated.  4) Decision-Making    Low complexity using standardized patient assessment instrument and/or measureable assessment of functional outcome.  Cumulative Therapy Evaluation is: Low complexity.    Previous and current functional limitations:  (See Goal Flow Sheet for this information)    Short term and Long term goals: (See Goal Flow Sheet for this information)     Communication ability:  Patient appears to be able to clearly communicate and understand verbal and written communication and follow directions correctly.  Treatment Explanation - The following has been discussed with the patient:   RX ordered/plan of care  Anticipated outcomes  Possible risks and side effects  This patient would benefit from PT intervention to resume normal activities.   Rehab potential is good.    Frequency:  1 X week, once daily  Duration:  for 6 weeks  Discharge Plan:  Achieve all LTG.  Independent in home treatment program.  Return to previous functional level by discharge.  Reach maximal therapeutic benefit.    Please refer to the daily flowsheet for treatment today, total treatment time and time spent performing 1:1 timed codes.

## 2018-01-17 NOTE — PROGRESS NOTES
[facility] Initial Evaluation  Subjective:  Patient is a 58 year old female presenting with rehab left ankle/foot hpi.                                      Pertinent medical history includes:  Migraines, sleep disorder/apnea, overweight and osteoarthritis.  Medical allergies: yes (Penicillian).  Other surgeries include:  Orthopedic surgery ( rt knee, 1 left knee and rt leg varicose veins ).  Current medications:  Anti-inflammatory and pain medication (Aleve for pain).  Current occupation is  semi retired.    Primary job tasks include:  Driving and other (And Normal household jobs).                                Objective:  System    Physical Exam    General     ROS    Assessment/Plan:

## 2018-01-17 NOTE — LETTER
[facility]  [address] [address] [place] [address]  [phone]    2018    Re: Iqra Huynh   :   1960  MRN:  [id number]   REFERRING PHYSICIAN:   Delroy Giron    [facility]    /Date of Initial Evaluation: 18  Visits:  Rxs Used: 1  Reason for Referral:     Acute pain of left shoulder  Pain in joint involving ankle and foot, right    EVALUATION SUMMARY    [facility] Initial Evaluation    Subjective:  Patient is a 58 year old female presenting with rehab right ankle/foot hpi. The history is provided by the patient. No  was used. Iqra Huynh is a 58 year old female with a right ankle and right foot condition.  This is a chronic condition  Iqra tore peroneal tendon right and repaired 6 years ago.   7 months ago started walking everyday for 1 hour. Developed pain medially,posterior, laterally and heel right foot and ankle. Was in boot cast Dec 2017 x  4-5 weeks and custom orthotics made which Iqra has been wearing since. Using boot cast PRN. Has been exercising in pool for past 2 weeks. Goal is to resume walking 45-60 mins (5 x week). .Patient reports pain:  Anterior, posterior and sub calcaneus.  Quality: throbbing, tightness and stiffness. and is intermittent Pain Scale: ranges 3-4/10.  Associated symptoms:  Loss of motion/stiffness and edema. Pain is the same all the time.  Symptoms are exacerbated by weight bearing, standing, walking, ascending stairs, descending stairs and activity and relieved by rest and bracing/immobilizing.  Since onset symptoms are gradually improving.  Special tests:  MRI (Iqra does not have results of MRI).    There was significant improvement following previous treatment.  General health as reported by patient is good.  Barriers include:  None as reported by patient. Red flags:  None as reported by patient.                        Objective:  Standing Alignment:   "  Ankle/Foot:  Normal  Gait:    Gait Type:  Antalgic   Assistive Devices:  None  Deviations:  General Deviations:  Stance time decr  Flexibility/Screens:   Lower Extremity:  Decreased right lower extremity flexibility:  Hamstrings; Gastroc and Soleus    Ankle/Foot Evaluation  ROM:      PROM:    Dorsiflexion:  Left:    10     Right:   5   Plantarflexion:  Left:    50     Right:  40  Inversion:  Left:      25     Right:   15  Eversion: Left:   25     Right:  25        Iqra Huynh   :   1960        Strength:    Dorsiflexion:  Right: /5 Strong/pain free  Pain:  Plantarflexion: Right: 3/5  Weak/painful  Pain:  Inversion:Right: 4-/5  Pain:  Eversion:Right: 4-/5  Weak/pain free  Pain:  LIGAMENT TESTING: not assessed  SPECIAL TESTS: not assessed  PALPATION:   Right ankle tenderness present at:   achilles tendon  EDEMA:   Left ankle edema present at: 21\"  Right ankle edema present at:  medial; lateral; posterior and 21.5\"     Figure 8 left: 21\"Figure 8 right: 21.5\"  MOBILITY TESTING:   Subtalar Right: hypomobile  FUNCTIONAL TESTS: not assessed    Assessment/Plan:    Patient is a 58 year old female with right side ankle complaints.    Patient has the following significant findings with corresponding treatment plan.               Diagnosis 1:  Post Tib and achilles tendonitis    Pain -  hot/cold therapy, US, manual therapy, splint/taping/bracing/orthotics, self management, education and home program  Decreased ROM/flexibility - manual therapy, therapeutic exercise, therapeutic activity and home program  Decreased joint mobility - manual therapy, therapeutic exercise, therapeutic activity and home program  Decreased strength - therapeutic exercise, therapeutic activities and home program  Edema - cold therapy and self management/home program  Impaired gait - gait training and home program  Impaired muscle performance - neuro re-education and home program  Decreased function - therapeutic activities and home " program    Previous and current functional limitations:  (See Goal Flow Sheet for this information)    Short term and Long term goals: (See Goal Flow Sheet for this information)     Communication ability:  Patient appears to be able to clearly communicate and understand verbal and written communication and follow directions correctly.  Treatment Explanation - The following has been discussed with the patient:   RX ordered/plan of care  Anticipated outcomes  Possible risks and side effects  This patient would benefit from PT intervention to resume normal activities.   Rehab potential is good.    Frequency:  1 X week, once daily  Duration:  for 6 weeks  Discharge Plan:  Achieve all LTG.  Independent in home treatment program.  Return to previous functional level by discharge.  Reach maximal therapeutic benefit.      Thank you for your referral.    INQUIRIES  Therapist: Adriana Hartman, PT  [address] [address]  Phone: [phone]  Fax: [phone]

## 2018-01-31 ENCOUNTER — THERAPY VISIT (OUTPATIENT)
Dept: PHYSICAL THERAPY | Facility: CLINIC | Age: 58
End: 2018-01-31
Payer: COMMERCIAL

## 2018-01-31 DIAGNOSIS — M25.571 PAIN IN JOINT INVOLVING ANKLE AND FOOT, RIGHT: ICD-10-CM

## 2018-01-31 PROCEDURE — 97112 NEUROMUSCULAR REEDUCATION: CPT | Mod: GP | Performed by: PHYSICAL THERAPIST

## 2018-01-31 PROCEDURE — 97035 APP MDLTY 1+ULTRASOUND EA 15: CPT | Mod: GP | Performed by: PHYSICAL THERAPIST

## 2018-01-31 PROCEDURE — 97140 MANUAL THERAPY 1/> REGIONS: CPT | Mod: GP | Performed by: PHYSICAL THERAPIST

## 2018-02-08 ENCOUNTER — THERAPY VISIT (OUTPATIENT)
Dept: PHYSICAL THERAPY | Facility: CLINIC | Age: 58
End: 2018-02-08
Payer: COMMERCIAL

## 2018-02-08 DIAGNOSIS — M25.571 PAIN IN JOINT INVOLVING ANKLE AND FOOT, RIGHT: ICD-10-CM

## 2018-02-08 PROCEDURE — 97140 MANUAL THERAPY 1/> REGIONS: CPT | Mod: GP | Performed by: PHYSICAL THERAPIST

## 2018-02-08 PROCEDURE — 97035 APP MDLTY 1+ULTRASOUND EA 15: CPT | Mod: GP | Performed by: PHYSICAL THERAPIST

## 2018-02-08 PROCEDURE — 97112 NEUROMUSCULAR REEDUCATION: CPT | Mod: GP | Performed by: PHYSICAL THERAPIST

## 2018-02-14 ENCOUNTER — THERAPY VISIT (OUTPATIENT)
Dept: PHYSICAL THERAPY | Facility: CLINIC | Age: 58
End: 2018-02-14
Payer: COMMERCIAL

## 2018-02-14 DIAGNOSIS — M25.571 PAIN IN JOINT INVOLVING ANKLE AND FOOT, RIGHT: ICD-10-CM

## 2018-02-14 PROCEDURE — 97110 THERAPEUTIC EXERCISES: CPT | Mod: GP | Performed by: PHYSICAL THERAPIST

## 2018-02-14 PROCEDURE — 97140 MANUAL THERAPY 1/> REGIONS: CPT | Mod: GP | Performed by: PHYSICAL THERAPIST

## 2018-02-14 PROCEDURE — 97035 APP MDLTY 1+ULTRASOUND EA 15: CPT | Mod: GP | Performed by: PHYSICAL THERAPIST

## 2018-02-14 NOTE — PROGRESS NOTES
Subjective:  HPI                    Objective:  System    Physical Exam    General     ROS    Assessment/Plan:    PROGRESS  REPORT    Progress reporting period is from 1-17-18 to 2-14-18.       SUBJECTIVE  Subjective changes noted by patient: Iqra reports that 3.5 hours of standing on Friday night irritated right ankle.  Began developing pain last hour of standing (8/10). Unable to perform HEP next day. Wore boot Sat night for 6 hours while continuing to work at [employer]. The pain was everywhere. Currently pain = 2/10. Able to resume exercises last night.     Current pain level is: 2/10.     Previous pain level was: 4/10.   Changes in function:  None  Adverse reaction to treatment or activity: long periods of standing aggravated.    OBJECTIVE  Changes noted in objective findings:   PROM DF/PF = 10/40 degrees (Last week = 12/60 degrees). TTP right peroneal, plantar fasciia origin and achilles tendon. Modified HEP to NWB exercises only. Stationary bike x 5-10 mins. Walk program 5-10 mins when able to perform painfree.      ASSESSMENT/PLAN  Updated problem list and treatment plan: Diagnosis 1:  Right Ankle Tendonitis  Pain -  hot/cold therapy, US, manual therapy, splint/taping/bracing/orthotics, self management, education and home program  Decreased ROM/flexibility - manual therapy, therapeutic exercise, therapeutic activity and home program  Decreased joint mobility - manual therapy, therapeutic exercise, therapeutic activity and home program  Decreased strength - therapeutic exercise, therapeutic activities and home program  Edema - cold therapy and cryocuff  Impaired gait - gait training and home program  Impaired muscle performance - neuro re-education and home program  Decreased function - therapeutic activities and home program  STG/LTGs have been met or progress has been made towards goals:  None  Assessment of Progress: The patient has had set backs in their progress.  Self Management Plans:  Patient has been  instructed in a home treatment program.  Patient  has been instructed in self management of symptoms.  I have re-evaluated this patient and find that the nature, scope, duration and intensity of the therapy is appropriate for the medical condition of the patient.  Iqra continues to require the following intervention to meet STG and LTG's:  PT    Recommendations:  This patient would benefit from continued therapy.     Frequency:  1 X week, once daily  Duration:  for 4 weeks        Please refer to the daily flowsheet for treatment today, total treatment time and time spent performing 1:1 timed codes.

## 2018-02-14 NOTE — LETTER
[facility]  [address] [address] [place] [address]  [phone]    2018    Re: Iqra Huynh   :   1960  MRN:  [id number]   REFERRING PHYSICIAN:   Delroy Giron    [facility]    Date of Initial Evaluation:  2018  Visits:  Rxs Used: 4  Reason for Referral:  Pain in joint involving ankle and foot, right    PROGRESS  REPORT  Progress reporting period is from 18 to 18.       SUBJECTIVE  Subjective changes noted by patient: Iqra reports that 3.5 hours of standing on Friday night irritated right ankle.  Began developing pain last hour of standing (8/10). Unable to perform HEP next day. Wore boot Sat night for 6 hours while continuing to work at [employer]. The pain was everywhere. Currently pain = 2/10. Able to resume exercises last night.  Current pain level is: 2/10.   Previous pain level was: 4/10.   Changes in function:  None.  Adverse reaction to treatment or activity: long periods of standing aggravated.    OBJECTIVE  Changes noted in objective findings:   PROM DF/PF = 10/40 degrees (Last week = 12/60 degrees). TTP right peroneal, plantar fasciia origin and achilles tendon. Modified HEP to NWB exercises only. Stationary bike x 5-10 mins. Walk program 5-10 mins when able to perform painfree.      ASSESSMENT/PLAN  Updated problem list and treatment plan: Diagnosis 1:  Right Ankle Tendonitis  Pain -  hot/cold therapy, US, manual therapy, splint/taping/bracing/orthotics, self management, education and home program  Decreased ROM/flexibility - manual therapy, therapeutic exercise, therapeutic activity and home program  Decreased joint mobility - manual therapy, therapeutic exercise, therapeutic activity and home program  Decreased strength - therapeutic exercise, therapeutic activities and home program  Edema - cold therapy and cryocuff  Impaired gait - gait training and home program  Impaired muscle performance - neuro re-education and  home program  Decreased function - therapeutic activities and home program  STG/LTGs have been met or progress has been made towards goals:  None  Assessment of Progress: The patient has had set backs in their progress.  Self Management Plans:  Patient has been instructed in a home treatment program.  Patient  has been instructed in self management of symptoms.  I have re-evaluated this patient and find that the nature, scope, duration and intensity of the therapy is appropriate for the medical condition of the patient.  Iqra continues to require the following intervention to meet STG and LTG's:  PT    Recommendations:  This patient would benefit from continued therapy.     Frequency:  1 X week, once daily  Duration:  for 4 weeks      Thank you for your referral.    INQUIRIES  Therapist: Adriana Hartman, DEBORAH SALDANA [address] [address]  Phone: [phone]  Fax: [phone]

## 2018-02-21 ENCOUNTER — THERAPY VISIT (OUTPATIENT)
Dept: PHYSICAL THERAPY | Facility: CLINIC | Age: 58
End: 2018-02-21
Payer: COMMERCIAL

## 2018-02-21 DIAGNOSIS — M25.571 PAIN IN JOINT INVOLVING ANKLE AND FOOT, RIGHT: ICD-10-CM

## 2018-02-21 PROCEDURE — 97112 NEUROMUSCULAR REEDUCATION: CPT | Mod: GP | Performed by: PHYSICAL THERAPIST

## 2018-02-21 PROCEDURE — 97140 MANUAL THERAPY 1/> REGIONS: CPT | Mod: GP | Performed by: PHYSICAL THERAPIST

## 2018-02-21 PROCEDURE — 97035 APP MDLTY 1+ULTRASOUND EA 15: CPT | Mod: GP | Performed by: PHYSICAL THERAPIST

## 2018-02-28 ENCOUNTER — THERAPY VISIT (OUTPATIENT)
Dept: PHYSICAL THERAPY | Facility: CLINIC | Age: 58
End: 2018-02-28
Payer: COMMERCIAL

## 2018-02-28 DIAGNOSIS — M25.571 PAIN IN JOINT INVOLVING ANKLE AND FOOT, RIGHT: ICD-10-CM

## 2018-02-28 PROCEDURE — 97035 APP MDLTY 1+ULTRASOUND EA 15: CPT | Mod: GP | Performed by: PHYSICAL THERAPIST

## 2018-02-28 PROCEDURE — 97112 NEUROMUSCULAR REEDUCATION: CPT | Mod: GP | Performed by: PHYSICAL THERAPIST

## 2018-02-28 PROCEDURE — 97140 MANUAL THERAPY 1/> REGIONS: CPT | Mod: GP | Performed by: PHYSICAL THERAPIST

## 2018-03-07 ENCOUNTER — THERAPY VISIT (OUTPATIENT)
Dept: PHYSICAL THERAPY | Facility: CLINIC | Age: 58
End: 2018-03-07
Payer: COMMERCIAL

## 2018-03-07 DIAGNOSIS — M25.571 PAIN IN JOINT INVOLVING ANKLE AND FOOT, RIGHT: ICD-10-CM

## 2018-03-07 PROCEDURE — 97035 APP MDLTY 1+ULTRASOUND EA 15: CPT | Mod: GP | Performed by: PHYSICAL THERAPIST

## 2018-03-07 PROCEDURE — 97112 NEUROMUSCULAR REEDUCATION: CPT | Mod: GP | Performed by: PHYSICAL THERAPIST

## 2018-03-07 PROCEDURE — 97140 MANUAL THERAPY 1/> REGIONS: CPT | Mod: GP | Performed by: PHYSICAL THERAPIST

## 2018-03-14 ENCOUNTER — THERAPY VISIT (OUTPATIENT)
Dept: PHYSICAL THERAPY | Facility: CLINIC | Age: 58
End: 2018-03-14
Payer: COMMERCIAL

## 2018-03-14 DIAGNOSIS — M25.571 PAIN IN JOINT INVOLVING ANKLE AND FOOT, RIGHT: ICD-10-CM

## 2018-03-14 PROCEDURE — 97140 MANUAL THERAPY 1/> REGIONS: CPT | Mod: GP | Performed by: PHYSICAL THERAPIST

## 2018-03-14 PROCEDURE — 97035 APP MDLTY 1+ULTRASOUND EA 15: CPT | Mod: GP | Performed by: PHYSICAL THERAPIST

## 2018-03-14 NOTE — LETTER
[facility]  [address] [address] [place] [id number]  [phone]    2018    Re: Iqra Huynh   :   1960  MRN:  [id number]   REFERRING PHYSICIAN:   Delroy Giron    [facility]    Date of Initial Evaluation:  2018  Visits:  Rxs Used: 8  Reason for Referral:  Pain in joint involving ankle and foot, right    DISCHARGE REPORT  Progress reporting period is from 18 to 3-14-18.       SUBJECTIVE  Subjective changes noted by patient: Iqra reports that she is walking 25-30 minutes w/o pain and riding stationary bike on alternate days.  Standing limited to 1 hour.     Current pain level is: 0/10.   Previous pain level was: 4/10. Changes in function:  Yes (See Goal flowsheet attached for changes in current functional level)  Adverse reaction to treatment or activity: None    OBJECTIVE  Changes noted in objective findings: PROM right ankle DF = 15 and PF = 55 degrees. Denies TTP achilles and peroneal tendon today. Iqra ambulates in normal gait pattern on level surfaces and stairs.     ASSESSMENT/PLAN  Updated problem list and treatment plan: Diagnosis 1:  Right Achilles and Peroneal Tendonitis  Pain -  hot/cold therapy, US, manual therapy, self management, education and home program  Decreased ROM/flexibility - manual therapy, therapeutic exercise, therapeutic activity and home program  Decreased strength - therapeutic exercise, therapeutic activities and home program  Impaired balance - neuro re-education, gait training, therapeutic activities and home program  Edema - cold therapy and self management/home program  Impaired gait - gait training and home program  Impaired muscle performance - neuro re-education and home program  Decreased function - therapeutic activities and home program  STG/LTGs have been met or progress has been made towards goals:  Yes (See Goal flow sheet completed today.)  Assessment of Progress: The patient's condition is  improving.  The patient has met all of their long term goals.  Self Management Plans:  Patient is independent in a home treatment program.  Patient is independent in self management of symptoms.  I have re-evaluated this patient and find that the nature, scope, duration and intensity of the therapy is appropriate for the medical condition of the patient.  Iqra continues to require the following intervention to meet STG and LTG's:  PT intervention is no longer required to meet STG/LTG.    Recommendations:  This patient is ready to be discharged from therapy and continue their home treatment program.    Thank you for your referral.    INQUIRIES  Therapist: Adriana Hartman, DEBORAH SALDANA [address] [address]  Phone: [phone]  Fax: [phone]

## 2018-03-14 NOTE — PROGRESS NOTES
Subjective:  HPI                    Objective:  System    Physical Exam    General     ROS    Assessment/Plan:    DISCHARGE REPORT    Progress reporting period is from 1-17-18 to 3-14-18.       SUBJECTIVE  Subjective changes noted by patient: Iqra reports that she is walking 25-30 minutes w/o pain and riding stationary bike on alternate days.  Standing limited to 1 hour.     Current pain level is: 0/10.     Previous pain level was: 4/10.   Changes in function:  Yes (See Goal flowsheet attached for changes in current functional level)  Adverse reaction to treatment or activity: None    OBJECTIVE  Changes noted in objective findings: PROM right ankle DF = 15 and PF = 55 degrees. Denies TTP achilles and peroneal tendon today. Iqra ambulates in normal gait pattern on level surfaces and stairs.     ASSESSMENT/PLAN  Updated problem list and treatment plan: Diagnosis 1:  Right Achilles and Peroneal Tendonitis  Pain -  hot/cold therapy, US, manual therapy, self management, education and home program  Decreased ROM/flexibility - manual therapy, therapeutic exercise, therapeutic activity and home program  Decreased strength - therapeutic exercise, therapeutic activities and home program  Impaired balance - neuro re-education, gait training, therapeutic activities and home program  Edema - cold therapy and self management/home program  Impaired gait - gait training and home program  Impaired muscle performance - neuro re-education and home program  Decreased function - therapeutic activities and home program  STG/LTGs have been met or progress has been made towards goals:  Yes (See Goal flow sheet completed today.)  Assessment of Progress: The patient's condition is improving.  The patient has met all of their long term goals.  Self Management Plans:  Patient is independent in a home treatment program.  Patient is independent in self management of symptoms.  I have re-evaluated this patient and find that the nature, scope,  duration and intensity of the therapy is appropriate for the medical condition of the patient.  Iqra continues to require the following intervention to meet STG and LTG's:  PT intervention is no longer required to meet STG/LTG.    Recommendations:  This patient is ready to be discharged from therapy and continue their home treatment program.    Please refer to the daily flowsheet for treatment today, total treatment time and time spent performing 1:1 timed codes.

## 2019-02-27 NOTE — LETTER
[facility]  [address] [address] [place] [id number]  [phone]    2017    Re: Iqra Huynh   :   1960  MRN:  [id number]   REFERRING PHYSICIAN:   Favian Mast    [facility]  Date of Initial Evaluation:  2017  Visits:  Rxs Used: 4  Reason for Referral:  Acute pain of left shoulder    PROGRESS  REPORT  Progress reporting period is from 17 to 17.       SUBJECTIVE  Subjective changes noted by patient:  Iqra reports interm vs constant pain anterior left shoulder. Aggravated if sleeps in wrong position. Mild pain with reaching overhead =2-3/10.   Current pain level is: 1/10 (2-3/10 overhead).   Previous pain level was: 6/10.   Changes in function:  Yes (See Goal flowsheet attached for changes in current functional level).  Adverse reaction to treatment or activity: None    OBJECTIVE  Changes noted in objective findings: Active Left shoulder FLexion = 170 (+10 degrees) with end range pain = 1/10. Strong but pain reported with resisted left shoulder ER, Flex and Abd @ 90 degrees. Suspect anterior shoulder impingement with biceps tendonitis (subscap?). Iqra making progress in ROM, strength and decreased pain.      ASSESSMENT/PLAN  Updated problem list and treatment plan: Diagnosis 1:  Left Ant. Shoulder Impingement  Pain -  hot/cold therapy, US, manual therapy, self management, education and home program  Decreased ROM/flexibility - manual therapy, therapeutic exercise and home program  Decreased strength - therapeutic exercise, therapeutic activities and home program  Inflammation - cold therapy, US and self management/home program  Impaired muscle performance - neuro re-education and home program  Decreased function - therapeutic activities and home program  Impaired posture - neuro re-education and home program  STG/LTGs have been met or progress has been made towards goals:  Yes (See Goal flow sheet completed today.)  Assessment of  Progress: The patient's condition is improving.  Patient is meeting short term goals and is progressing towards long term goals.  Self Management Plans:  Patient has been instructed in a home treatment program.  Patient  has been instructed in self management of symptoms.  I have re-evaluated this patient and find that the nature, scope, duration and intensity of the therapy is appropriate for the medical condition of the patient.  Iqra continues to require the following intervention to meet STG and LTG's:  PT      Recommendations:  This patient would benefit from continued therapy.     Frequency:  2 X week, once daily  Duration:  for 2 weeks    Thank you for your referral.    INQUIRIES  Therapist: Adriana Hartman, PT  [facility] [address] [address]  Phone: [phone]  Fax: [phone]      Muscle Hinge Flap Text: The defect edges were debeveled with a #15 scalpel blade.  Given the size, depth and location of the defect and the proximity to free margins a muscle hinge flap was deemed most appropriate.  Using a sterile surgical marker, an appropriate hinge flap was drawn incorporating the defect. The area thus outlined was incised with a #15 scalpel blade.  The skin margins were undermined to an appropriate distance in all directions utilizing iris scissors.

## 2021-10-26 ENCOUNTER — APPOINTMENT (OUTPATIENT)
Dept: URBAN - METROPOLITAN AREA CLINIC 259 | Age: 61
Setting detail: DERMATOLOGY
End: 2021-10-26

## 2021-10-26 DIAGNOSIS — L82.0 INFLAMED SEBORRHEIC KERATOSIS: ICD-10-CM

## 2021-10-26 DIAGNOSIS — L57.8 OTHER SKIN CHANGES DUE TO CHRONIC EXPOSURE TO NONIONIZING RADIATION: ICD-10-CM

## 2021-10-26 PROCEDURE — OTHER MIPS QUALITY: OTHER

## 2021-10-26 PROCEDURE — OTHER COUNSELING: OTHER

## 2021-10-26 PROCEDURE — 99202 OFFICE O/P NEW SF 15 MIN: CPT | Mod: 25

## 2021-10-26 PROCEDURE — OTHER LIQUID NITROGEN: OTHER

## 2021-10-26 PROCEDURE — 17110 DESTRUCT B9 LESION 1-14: CPT

## 2021-10-26 ASSESSMENT — LOCATION SIMPLE DESCRIPTION DERM: LOCATION SIMPLE: CHEST

## 2021-10-26 ASSESSMENT — LOCATION DETAILED DESCRIPTION DERM
LOCATION DETAILED: RIGHT LATERAL SUPERIOR CHEST
LOCATION DETAILED: RIGHT MEDIAL SUPERIOR CHEST

## 2021-10-26 ASSESSMENT — LOCATION ZONE DERM: LOCATION ZONE: TRUNK

## 2021-10-26 NOTE — PROCEDURE: LIQUID NITROGEN
Total Number Of Lesions Treated: 5
Render In Bullet Format When Appropriate: No
Duration Of Freeze Thaw-Cycle (Seconds): 0
Medical Necessity Clause: This procedure was medically necessary because the lesions that were treated were:
Detail Level: Zone
Medical Necessity Information: It is in your best interest to select a reason for this procedure from the list below. All of these items fulfill various CMS LCD requirements except the new and changing color options.
Post-Care Instructions: I reviewed with the patient in detail post-care instructions. Patient is to wear sunprotection, and avoid picking at any of the treated lesions. Pt may apply Vaseline to crusted or scabbing areas.
Render Post Care In The Note?: yes
Consent: The patient's consent was obtained including but not limited to risks of crusting, scabbing, blistering, scarring, darker or lighter pigmentary change, recurrence, incomplete removal and infection.

## 2021-10-26 NOTE — PROCEDURE: MIPS QUALITY
